# Patient Record
Sex: MALE | Race: WHITE | Employment: FULL TIME | URBAN - METROPOLITAN AREA
[De-identification: names, ages, dates, MRNs, and addresses within clinical notes are randomized per-mention and may not be internally consistent; named-entity substitution may affect disease eponyms.]

---

## 2020-02-27 ENCOUNTER — OFFICE VISIT (OUTPATIENT)
Dept: FAMILY MEDICINE CLINIC | Age: 35
End: 2020-02-27
Payer: COMMERCIAL

## 2020-02-27 ENCOUNTER — HOSPITAL ENCOUNTER (OUTPATIENT)
Dept: GENERAL RADIOLOGY | Age: 35
Discharge: HOME | End: 2020-02-29
Payer: COMMERCIAL

## 2020-02-27 ENCOUNTER — HOSPITAL ENCOUNTER (OUTPATIENT)
Age: 35
Discharge: HOME | End: 2020-02-29
Payer: COMMERCIAL

## 2020-02-27 VITALS
DIASTOLIC BLOOD PRESSURE: 88 MMHG | OXYGEN SATURATION: 97 % | HEART RATE: 79 BPM | WEIGHT: 239 LBS | BODY MASS INDEX: 32.37 KG/M2 | HEIGHT: 72 IN | TEMPERATURE: 98.1 F | SYSTOLIC BLOOD PRESSURE: 126 MMHG

## 2020-02-27 PROCEDURE — 73110 X-RAY EXAM OF WRIST: CPT

## 2020-02-27 PROCEDURE — 99213 OFFICE O/P EST LOW 20 MIN: CPT | Performed by: NURSE PRACTITIONER

## 2020-02-27 PROCEDURE — 73560 X-RAY EXAM OF KNEE 1 OR 2: CPT

## 2020-02-27 RX ORDER — CEPHALEXIN 500 MG/1
500 CAPSULE ORAL 3 TIMES DAILY
Qty: 15 CAPSULE | Refills: 0 | Status: SHIPPED | OUTPATIENT
Start: 2020-02-27 | End: 2020-03-03

## 2020-02-27 ASSESSMENT — ENCOUNTER SYMPTOMS
COUGH: 0
SHORTNESS OF BREATH: 0
STRIDOR: 0
DIARRHEA: 0
WHEEZING: 0
COLOR CHANGE: 0
ABDOMINAL PAIN: 0
VOMITING: 0
NAUSEA: 0
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
## 2020-03-05 ENCOUNTER — OFFICE VISIT (OUTPATIENT)
Dept: ORTHOPEDIC SURGERY | Age: 35
End: 2020-03-05
Payer: COMMERCIAL

## 2020-03-05 VITALS — WEIGHT: 235 LBS | HEIGHT: 72 IN | BODY MASS INDEX: 31.83 KG/M2

## 2020-03-05 PROCEDURE — 99213 OFFICE O/P EST LOW 20 MIN: CPT | Performed by: ORTHOPAEDIC SURGERY

## 2020-03-05 NOTE — PROGRESS NOTES
Alcohol use: Yes     Comment: rare    Drug use: Never    Sexual activity: Not on file   Lifestyle    Physical activity:     Days per week: Not on file     Minutes per session: Not on file    Stress: Not on file   Relationships    Social connections:     Talks on phone: Not on file     Gets together: Not on file     Attends [employer] service: Not on file     Active member of club or organization: Not on file     Attends meetings of clubs or organizations: Not on file     Relationship status: Not on file    Intimate partner violence:     Fear of current or ex partner: Not on file     Emotionally abused: Not on file     Physically abused: Not on file     Forced sexual activity: Not on file   Other Topics Concern    Not on file   Social History Narrative    Not on file     No family history on file. Physical Exam:    Ht 6' (1.829 m)   Wt 235 lb (106.6 kg)   BMI 31.87 kg/m²     GENERAL: alert, appears stated age, cooperative, no acute distress    HEENT: Head is normocephalic, atraumatic. PERRLA. SKIN: Clean, dry, intact. There is not any cellulitis or cutaneous lesions noted in the upper extremities except noted below in MSK    PULMONARY: breathing is regular and unlabored, no acute distress    CV: The bilateral upper and lower extremities are warm and well-perfused with brisk capillary refill. 2+ pulses UE and LE bilateral.     PSYCHIATRY: Pleasant mood, appropriate behavior, follows commands    NEURO: Sensation is intact distally with light touch with no alteration. Motor exam of the upper extremities show elbow flexion and extension, wrist flexion and extension, and finger abduction grossly intact 5/5. Upper extremity reflexes are bilaterally symmetrical and within normal limits. LYMPH: No lymphedema present distally in upper or lower extremity. MUSCULOSKELETAL:  Examination of left knee reveals no large effusion. Range of motion is 0 to 101 degrees without pain.   No varus valgus